# Patient Record
Sex: FEMALE | Race: WHITE | NOT HISPANIC OR LATINO | Employment: UNEMPLOYED | URBAN - NONMETROPOLITAN AREA
[De-identification: names, ages, dates, MRNs, and addresses within clinical notes are randomized per-mention and may not be internally consistent; named-entity substitution may affect disease eponyms.]

---

## 2018-04-11 ENCOUNTER — HISTORICAL (OUTPATIENT)
Dept: ADMINISTRATIVE | Facility: HOSPITAL | Age: 23
End: 2018-04-11

## 2018-04-12 ENCOUNTER — HISTORICAL (OUTPATIENT)
Dept: ADMINISTRATIVE | Facility: HOSPITAL | Age: 23
End: 2018-04-12

## 2018-10-01 ENCOUNTER — HISTORICAL (OUTPATIENT)
Dept: ADMINISTRATIVE | Facility: HOSPITAL | Age: 23
End: 2018-10-01

## 2018-11-06 ENCOUNTER — HISTORICAL (OUTPATIENT)
Dept: ADMINISTRATIVE | Facility: HOSPITAL | Age: 23
End: 2018-11-06

## 2019-01-28 ENCOUNTER — HISTORICAL (OUTPATIENT)
Dept: ADMINISTRATIVE | Facility: HOSPITAL | Age: 24
End: 2019-01-28

## 2019-03-25 ENCOUNTER — HISTORICAL (OUTPATIENT)
Dept: ADMINISTRATIVE | Facility: HOSPITAL | Age: 24
End: 2019-03-25

## 2022-09-09 PROBLEM — G93.2 BIH (BENIGN INTRACRANIAL HYPERTENSION): Status: ACTIVE | Noted: 2022-09-09

## 2024-07-02 DIAGNOSIS — G93.2 IIH (IDIOPATHIC INTRACRANIAL HYPERTENSION): Primary | ICD-10-CM

## 2024-07-05 DIAGNOSIS — G93.2 BIH (BENIGN INTRACRANIAL HYPERTENSION): Primary | ICD-10-CM

## 2024-10-03 ENCOUNTER — OFFICE VISIT (OUTPATIENT)
Dept: OPHTHALMOLOGY | Facility: CLINIC | Age: 29
End: 2024-10-03
Payer: MEDICAID

## 2024-10-03 VITALS — WEIGHT: 203 LBS | BODY MASS INDEX: 34.66 KG/M2 | HEIGHT: 64 IN

## 2024-10-03 DIAGNOSIS — G93.2 BIH (BENIGN INTRACRANIAL HYPERTENSION): ICD-10-CM

## 2024-10-03 PROCEDURE — 92133 CPTRZD OPH DX IMG PST SGM ON: CPT | Mod: PBBFAC,PN | Performed by: OPHTHALMOLOGY

## 2024-10-03 PROCEDURE — 99213 OFFICE O/P EST LOW 20 MIN: CPT | Mod: PBBFAC,PN | Performed by: STUDENT IN AN ORGANIZED HEALTH CARE EDUCATION/TRAINING PROGRAM

## 2024-10-03 RX ORDER — BUTALBITAL, ACETAMINOPHEN AND CAFFEINE 300; 40; 50 MG/1; MG/1; MG/1
CAPSULE ORAL
COMMUNITY
Start: 2024-09-19

## 2024-10-03 NOTE — PROGRESS NOTES
HPI    Referred for Benign Intercranial hypertension   Last edited by Kiarra Manley MA on 10/3/2024 11:44 AM.            Assessment /Plan     For exam results, see Encounter Report.    BIH (benign intracranial hypertension)  -     Ambulatory referral/consult to Ophthalmology      Octn 10/3/24: 111 white nt// 115 white nasally       IIH ( first visit here 10/3/24)   - dx in 2022 with brain imaging and lpx2, off diamox since 10/2023 per pt  - now pregnant 30 weeks 10/3/24: with recurrence of headaches and blurring of vision, no diplopia or tinnitus, no nv.   - mri brain wo 2020 wnl (not with/wo and no orbit or mrv)    - LP OP 22cm per media recovers 2020  - 10/3/24: color plates full, eomi, no apd, 20/20 ou with mrx, no swelling ou on exam, optos done (small nerves/cup) . Can observe for now. Will repeat testing once delivered baby       3 months dfe octn, optos, vf 24-2 ou

## 2025-05-23 ENCOUNTER — OFFICE VISIT (OUTPATIENT)
Dept: OPHTHALMOLOGY | Facility: CLINIC | Age: 30
End: 2025-05-23
Payer: MEDICAID

## 2025-05-23 VITALS — WEIGHT: 203.06 LBS | HEIGHT: 64 IN | BODY MASS INDEX: 34.67 KG/M2

## 2025-05-23 DIAGNOSIS — G93.2 BIH (BENIGN INTRACRANIAL HYPERTENSION): Primary | ICD-10-CM

## 2025-05-23 DIAGNOSIS — G43.909 MIGRAINE WITHOUT STATUS MIGRAINOSUS, NOT INTRACTABLE, UNSPECIFIED MIGRAINE TYPE: ICD-10-CM

## 2025-05-23 PROCEDURE — 99213 OFFICE O/P EST LOW 20 MIN: CPT | Mod: PBBFAC,PN

## 2025-05-23 RX ORDER — PREDNISONE 20 MG/1
20 TABLET ORAL 2 TIMES DAILY
COMMUNITY
Start: 2025-02-14

## 2025-05-23 RX ORDER — METOPROLOL SUCCINATE 25 MG/1
25 TABLET, EXTENDED RELEASE ORAL
COMMUNITY

## 2025-05-23 RX ORDER — IBUPROFEN 800 MG/1
800 TABLET, FILM COATED ORAL
COMMUNITY

## 2025-05-23 RX ORDER — OXYCODONE AND ACETAMINOPHEN 5; 325 MG/1; MG/1
TABLET ORAL
COMMUNITY
Start: 2024-11-19

## 2025-05-23 RX ORDER — PROMETHAZINE HYDROCHLORIDE AND DEXTROMETHORPHAN HYDROBROMIDE 6.25; 15 MG/5ML; MG/5ML
10 SYRUP ORAL EVERY 6 HOURS PRN
COMMUNITY
Start: 2025-02-14

## 2025-05-23 RX ORDER — ALBUTEROL SULFATE 0.83 MG/ML
SOLUTION RESPIRATORY (INHALATION)
COMMUNITY
Start: 2025-02-14

## 2025-05-23 RX ORDER — PROMETHAZINE HYDROCHLORIDE 25 MG/1
25 TABLET ORAL EVERY 8 HOURS PRN
COMMUNITY

## 2025-05-23 RX ORDER — PHENYLEPH/TROPICAMIDE IN WATER 2.5 %-1 %
1 DROPS OPHTHALMIC (EYE) ONCE
Status: COMPLETED | OUTPATIENT
Start: 2025-05-23 | End: 2025-05-23

## 2025-05-23 RX ORDER — ESCITALOPRAM OXALATE 10 MG/1
10 TABLET ORAL
COMMUNITY

## 2025-05-23 RX ADMIN — Medication 1 DROP: at 10:05

## 2025-05-23 NOTE — PROGRESS NOTES
HPI    3 months dfe octn, optos, vf 24-2 ou   Last edited by Darcy Reyes MA on 5/23/2025 10:06 AM.          Assessment /Plan     For exam results, see Encounter Report.    BIH (benign intracranial hypertension)  -     tropicamide /PHENYLephrine opthalmic solution 1 drop    Migraine without status migrainosus, not intractable, unspecified migraine type  -     Ambulatory referral/consult to Neurology; Future; Expected date: 05/30/2025      Octn 10/3/24: 111 white nt// 115 white nasally   5/23/25: 106//109, white N OU    HVF 24-2 5/23/25  RE: FL 2/11, FP 0%, FN 11%, enlargement of blind spot and superior peripheral loss  LE: FL 2/11, FP 0%, FN 9%, nonspecific nasal changes, essentially full     Concern for IIH  Disc at risk  - dx in 2022 with brain imaging and lpx2 (opening pressure 22 and 13 based on previous records), off diamox since 10/2023 per pt  - LP OP 22cm per media recovers 2020  - pregnant 30 weeks 10/3/24: with recurrence of headaches and blurring of vision, no diplopia or tinnitus, no nv.   - mri brain wo 2020 wnl (not with/wo and no orbit or mrv)    - 10/3/24: color plates full, eomi, no apd, 20/20 ou with mrx, no swelling ou on exam, optos done (small nerves/cup) . Can observe for now. Will repeat testing once delivered baby   - 5/23/25: LTFU since 10/2024, Patient returns with worsening headaches, +photophobia, +phonophobia, +n/v with some pulsating HA right side, no tinnitus, has intermittent diplopia. Now weighs 203lb, per patient was at 220-230 prior to delivering the baby.  - VA stable at 20/20, color plates full, OCT rnfl with mild edema nasally, but on exam optic discs appear sharp. HVF showing possible enlargement of blindspot on the right. EOMI, no changes on cover/uncover or alternate cover test  - symptoms does not correlate with clinical exam, low concern for IIH given normal opening pressure in the past and no disc edema  - Characteristics of headaches consistent with migraines, will send  referral to [facility] neurology clinic for further evaluation. No need to start diamox    RTC 3-4mo ISAMAR day for repeat HVF 24-2